# Patient Record
Sex: MALE | Race: BLACK OR AFRICAN AMERICAN | NOT HISPANIC OR LATINO | Employment: FULL TIME | URBAN - METROPOLITAN AREA
[De-identification: names, ages, dates, MRNs, and addresses within clinical notes are randomized per-mention and may not be internally consistent; named-entity substitution may affect disease eponyms.]

---

## 2018-07-13 ENCOUNTER — CLINICAL SUPPORT (OUTPATIENT)
Dept: OCCUPATIONAL MEDICINE | Facility: CLINIC | Age: 33
End: 2018-07-13

## 2018-07-13 DIAGNOSIS — Z00.00 ENCOUNTER FOR PHYSICAL EXAMINATION: ICD-10-CM

## 2018-07-13 PROCEDURE — 89240 UNLISTED MISC PATH TEST: CPT | Mod: S$GLB,,, | Performed by: PHYSICIAN ASSISTANT

## 2018-07-13 PROCEDURE — 94010 BREATHING CAPACITY TEST: CPT | Mod: S$GLB,,, | Performed by: PHYSICIAN ASSISTANT

## 2018-07-13 PROCEDURE — 99499 UNLISTED E&M SERVICE: CPT | Mod: S$GLB,,, | Performed by: PHYSICIAN ASSISTANT

## 2018-07-13 PROCEDURE — 95832 PR HAND MUSCLE TEST,MANUAL: CPT | Mod: S$GLB,,, | Performed by: PREVENTIVE MEDICINE

## 2018-07-13 PROCEDURE — 99002 DEVICE HANDLING PHYS/QHP: CPT | Mod: S$GLB,,, | Performed by: PHYSICIAN ASSISTANT

## 2018-07-13 PROCEDURE — 97750 PHYSICAL PERFORMANCE TEST: CPT | Mod: S$GLB,,, | Performed by: PHYSICIAN ASSISTANT

## 2018-07-13 PROCEDURE — 99080 SPECIAL REPORTS OR FORMS: CPT | Mod: S$GLB,,, | Performed by: PHYSICIAN ASSISTANT

## 2022-09-14 ENCOUNTER — HOSPITAL ENCOUNTER (EMERGENCY)
Facility: HOSPITAL | Age: 37
Discharge: HOME | End: 2022-09-14
Attending: EMERGENCY MEDICINE
Payer: COMMERCIAL

## 2022-09-14 VITALS
WEIGHT: 300 LBS | HEART RATE: 77 BPM | HEIGHT: 74 IN | OXYGEN SATURATION: 96 % | DIASTOLIC BLOOD PRESSURE: 83 MMHG | TEMPERATURE: 99 F | SYSTOLIC BLOOD PRESSURE: 140 MMHG | BODY MASS INDEX: 38.5 KG/M2 | RESPIRATION RATE: 18 BRPM

## 2022-09-14 DIAGNOSIS — L03.90 CELLULITIS, UNSPECIFIED CELLULITIS SITE: Primary | ICD-10-CM

## 2022-09-14 DIAGNOSIS — B99.9 INFECTION: ICD-10-CM

## 2022-09-14 DIAGNOSIS — R52 PAIN: ICD-10-CM

## 2022-09-14 LAB
ALBUMIN SERPL BCP-MCNC: 4.1 G/DL (ref 3.5–5.2)
ALP SERPL-CCNC: 75 U/L (ref 55–135)
ALT SERPL W/O P-5'-P-CCNC: 53 U/L (ref 10–44)
ANION GAP SERPL CALC-SCNC: 10 MMOL/L (ref 8–16)
AST SERPL-CCNC: 34 U/L (ref 10–40)
BASOPHILS # BLD AUTO: 0.04 K/UL (ref 0–0.2)
BASOPHILS NFR BLD: 0.5 % (ref 0–1.9)
BILIRUB SERPL-MCNC: 1 MG/DL (ref 0.1–1)
BUN SERPL-MCNC: 13 MG/DL (ref 6–20)
CALCIUM SERPL-MCNC: 9.2 MG/DL (ref 8.7–10.5)
CHLORIDE SERPL-SCNC: 106 MMOL/L (ref 95–110)
CO2 SERPL-SCNC: 23 MMOL/L (ref 23–29)
CREAT SERPL-MCNC: 1 MG/DL (ref 0.5–1.4)
DIFFERENTIAL METHOD: ABNORMAL
EOSINOPHIL # BLD AUTO: 0.2 K/UL (ref 0–0.5)
EOSINOPHIL NFR BLD: 2.2 % (ref 0–8)
ERYTHROCYTE [DISTWIDTH] IN BLOOD BY AUTOMATED COUNT: 12.5 % (ref 11.5–14.5)
EST. GFR  (NO RACE VARIABLE): >60 ML/MIN/1.73 M^2
GLUCOSE SERPL-MCNC: 89 MG/DL (ref 70–110)
HCT VFR BLD AUTO: 40.4 % (ref 40–54)
HGB BLD-MCNC: 13.8 G/DL (ref 14–18)
IMM GRANULOCYTES # BLD AUTO: 0.04 K/UL (ref 0–0.04)
IMM GRANULOCYTES NFR BLD AUTO: 0.5 % (ref 0–0.5)
LYMPHOCYTES # BLD AUTO: 3.1 K/UL (ref 1–4.8)
LYMPHOCYTES NFR BLD: 40.8 % (ref 18–48)
MCH RBC QN AUTO: 29.1 PG (ref 27–31)
MCHC RBC AUTO-ENTMCNC: 34.2 G/DL (ref 32–36)
MCV RBC AUTO: 85 FL (ref 82–98)
MONOCYTES # BLD AUTO: 0.9 K/UL (ref 0.3–1)
MONOCYTES NFR BLD: 11.4 % (ref 4–15)
NEUTROPHILS # BLD AUTO: 3.4 K/UL (ref 1.8–7.7)
NEUTROPHILS NFR BLD: 44.6 % (ref 38–73)
NRBC BLD-RTO: 0 /100 WBC
PLATELET # BLD AUTO: 290 K/UL (ref 150–450)
PMV BLD AUTO: 9.5 FL (ref 9.2–12.9)
POTASSIUM SERPL-SCNC: 3.9 MMOL/L (ref 3.5–5.1)
PROT SERPL-MCNC: 7.4 G/DL (ref 6–8.4)
RBC # BLD AUTO: 4.74 M/UL (ref 4.6–6.2)
SODIUM SERPL-SCNC: 139 MMOL/L (ref 136–145)
WBC # BLD AUTO: 7.57 K/UL (ref 3.9–12.7)

## 2022-09-14 PROCEDURE — 99285 EMERGENCY DEPT VISIT HI MDM: CPT | Mod: 25

## 2022-09-14 PROCEDURE — 85025 COMPLETE CBC W/AUTO DIFF WBC: CPT | Performed by: NURSE PRACTITIONER

## 2022-09-14 PROCEDURE — 80053 COMPREHEN METABOLIC PANEL: CPT | Performed by: NURSE PRACTITIONER

## 2022-09-14 PROCEDURE — 36415 COLL VENOUS BLD VENIPUNCTURE: CPT | Performed by: NURSE PRACTITIONER

## 2022-09-14 PROCEDURE — 96374 THER/PROPH/DIAG INJ IV PUSH: CPT

## 2022-09-14 PROCEDURE — 63600175 PHARM REV CODE 636 W HCPCS: Performed by: NURSE PRACTITIONER

## 2022-09-14 RX ORDER — CLINDAMYCIN HYDROCHLORIDE 150 MG/1
300 CAPSULE ORAL 4 TIMES DAILY
Qty: 56 CAPSULE | Refills: 0 | Status: SHIPPED | OUTPATIENT
Start: 2022-09-14 | End: 2022-09-21

## 2022-09-14 RX ORDER — KETOROLAC TROMETHAMINE 30 MG/ML
10 INJECTION, SOLUTION INTRAMUSCULAR; INTRAVENOUS
Status: COMPLETED | OUTPATIENT
Start: 2022-09-14 | End: 2022-09-14

## 2022-09-14 RX ADMIN — KETOROLAC TROMETHAMINE 10 MG: 30 INJECTION, SOLUTION INTRAMUSCULAR; INTRAVENOUS at 08:09

## 2022-09-14 NOTE — ED PROVIDER NOTES
Date: 9/14/2022    SCRIBE #1 NOTE: IAmber, am scribing for, and in the presence of,  YOSELIN Zapata.     History     Chief Complaint   Patient presents with    Leg Swelling     Reports swelling to lower left leg -- worsening over the past month. Was on abx but notes no improvement.      Time seen by provider: 6:40 PM on 09/14/2022    Roney Nixon is a 36 y.o. male with an unknown PMHx who presents to the ED for evaluation of LLE swelling with overlying anterior wound ( ~2 cm) and associated pain that onset 1 week ago.  Patient reports no acute injuries to the area.  He notes a frequent Hx of LLE edema secondary to PSHx at age 18, but specifies it has never been like this.  Patient does not take daily medications and has a negative Hx of DM.  He is c/o surrounding redness, wheezing, serous discharge, and gait abnormalities secondary to pain.  Patient walks about 12 hours a day and expresses concern that it may have exacerbated Sx.  The patient denies a fever, CP, SOB or any other symptoms at this time.  He admits chronic difficulty with dorsiflexion due to previous injury and surgery ~18 years ago.  No anticoagulant therapy currently.  Current smoker of 1 pack per week.  PSHx includes L ORIF tibia fracture and L ankle fracture surgery.      The history is provided by the patient.   Review of patient's allergies indicates:   Allergen Reactions    Honey Hives and Swelling     History reviewed. No pertinent past medical history.  Past Surgical History:   Procedure Laterality Date    ANKLE FRACTURE SURGERY Left     ORIF TIBIA FRACTURE Left      Family History   Problem Relation Age of Onset    Hypertension Mother     Hypertension Father     Diabetes Mellitus Maternal Grandmother      Social History     Tobacco Use    Smoking status: Some Days     Years: 3.00     Types: Cigarettes     Review of Systems   Constitutional:  Negative for fever and unexpected weight change.   HENT:  Negative for sore throat.     Respiratory:  Negative for shortness of breath and wheezing.    Cardiovascular:  Positive for leg swelling (L). Negative for chest pain.   Gastrointestinal:  Negative for nausea.   Genitourinary:  Negative for dysuria.   Musculoskeletal:  Positive for gait problem and myalgias. Negative for back pain.   Skin:  Positive for color change (redness) and wound (with serous drainage). Negative for rash.   Neurological:  Negative for weakness.   Hematological:  Does not bruise/bleed easily.     Physical Exam     Initial Vitals [09/14/22 1637]   BP Pulse Resp Temp SpO2   (!) 152/88 90 18 98.5 °F (36.9 °C) 99 %      MAP       --         Physical Exam    Nursing note and vitals reviewed.  Constitutional: Vital signs are normal. He appears well-developed and well-nourished.   HENT:   Head: Normocephalic and atraumatic.   Eyes: Pupils are equal, round, and reactive to light.   Neck: Neck supple.   Cardiovascular:  Normal rate, regular rhythm, normal heart sounds and intact distal pulses.     Exam reveals no gallop and no friction rub.       No murmur heard.  Pulses:       Dorsalis pedis pulses are 2+ on the left side.        Posterior tibial pulses are 2+ on the left side.   Pulmonary/Chest: Breath sounds normal. He has no wheezes. He has no rhonchi. He has no rales.   Abdominal: Abdomen is soft. Bowel sounds are normal. There is no abdominal tenderness.   Musculoskeletal:      Cervical back: Neck supple.      Left lower leg: Swelling and tenderness present.     Neurological: He is alert and oriented to person, place, and time. He has normal strength.   Skin: Skin is warm, dry and intact.   Superficial wound to the L anterior tibia that is ~ 2 cm x 1 cm without pustule drainage with erythematous base.     Psychiatric: He has a normal mood and affect. His speech is normal and behavior is normal.       ED Course   Procedures  Labs Reviewed   CBC W/ AUTO DIFFERENTIAL - Abnormal; Notable for the following components:        Result Value    Hemoglobin 13.8 (*)     All other components within normal limits    Narrative:     Collection has been rescheduled by [email or web address] at 09/14/2022 18:56 Reason:   Patient unavailable   COMPREHENSIVE METABOLIC PANEL - Abnormal; Notable for the following components:    ALT 53 (*)     All other components within normal limits    Narrative:     Collection has been rescheduled by MRQ1 at 09/14/2022 18:56 Reason:   Patient unavailable          Imaging Results              US Lower Extremity Veins Left (Final result)  Result time 09/14/22 19:54:56      Final result by Kathy Hartman MD (09/14/22 19:54:56)                   Impression:      No evidence of deep venous thrombosis in the left lower extremity.      Electronically signed by: Kathy Hartman  Date:    09/14/2022  Time:    19:54               Narrative:    EXAMINATION:  US LOWER EXTREMITY VEINS LEFT    CLINICAL HISTORY:  Unspecified infectious disease    TECHNIQUE:  Duplex and color flow Doppler evaluation and graded compression of the left lower extremity veins was performed.    COMPARISON:  None    FINDINGS:  Left thigh veins: The common femoral, femoral, popliteal, upper greater saphenous, and deep femoral veins are patent and free of thrombus. The veins are normally compressible and have normal phasic flow and augmentation response.    Left calf veins: The visualized calf veins are patent.    Contralateral CFV: The contralateral (right) common femoral vein is patent and free of thrombus.    Miscellaneous: None                                       X-Ray Tibia Fibula 2 View Left (Final result)  Result time 09/14/22 19:50:44      Final result by Kathy Hartman MD (09/14/22 19:50:44)                   Impression:      Soft tissue swelling over the proximal leg.    Prior tibial and fibular fracture with fixation hardware.      Electronically signed by: Kathy Hartman  Date:    09/14/2022  Time:    19:50               Narrative:     EXAMINATION:  XR TIBIA FIBULA 2 VIEW LEFT    CLINICAL HISTORY:  Pain, unspecified    TECHNIQUE:  AP and lateral views of the left tibia and fibula were performed.    COMPARISON:  None.    FINDINGS:  Old fracture deformity with lanie along the tibial diaphysis noted.  Periostitis is seen and appears chronic.  Soft tissue swelling overlies the proximal calf of uncertain etiology.                                       X-Ray Ankle Complete Left (Final result)  Result time 09/14/22 19:49:34      Final result by Kathy Hartman MD (09/14/22 19:49:34)                   Impression:      No acute abnormality.  Degenerative changes.      Electronically signed by: Kathy Hartman  Date:    09/14/2022  Time:    19:49               Narrative:    EXAMINATION:  XR ANKLE COMPLETE 3 VIEW LEFT    CLINICAL HISTORY:  Pain, unspecified    TECHNIQUE:  AP, lateral and oblique views of the left ankle were performed.    COMPARISON:  None    FINDINGS:  Soft tissue swelling over the left ankle.  Periostitis along the distal tibia and fibula.  There are degenerative changes at the ankle joint.  Calcaneal plantar and dorsal enthesophytes noted.  Degenerative changes along the intertarsal joints.                                       Medications   ketorolac injection 9.999 mg (9.999 mg Intravenous Given 9/14/22 2015)     Medical Decision Making:   History:   Old Medical Records: I decided to obtain old medical records.  Differential Diagnosis:   Cellulitis  DVT  fracture  Clinical Tests:   Lab Tests: Ordered and Reviewed  Radiological Study: Ordered and Reviewed     APC / Resident Notes:   Patient is a 36 y.o. male who presents to the ED 09/16/2022 who underwent emergent evaluation for LLE swelling which is chronic due to old injury however recently became worse have developed open wound. There is no abscess. No pain out of proportion. Pt has no systemic signs of illness. No sign of distal neurovascular compromise or compartment syndrome.   Without evidence of DVT.  I do not think DVT.  Labs unremarkable.  No leukocytosis.  The extremity has mild erythema.  This is concerning for cellulitis.  Patient has no recent injury trauma.  X-rays are unremarkable.  I do not think septic joint.  I do not think admission for IV antibiotics is indicated at this time.  Patient is given p.o. antibiotics. Based on my clinical evaluation, I do not appreciate any immediate, emergent, or life threatening condition or etiology that warrants additional workup today and feel that the patient can be discharged with close follow up care.  Follow up and return precautions discussed; patient verbalized understanding and is agreeable to plan of care. Patient discharged home in stable condition.              Scribe Attestation:   Scribe #1: I performed the above scribed service and the documentation accurately describes the services I performed. I attest to the accuracy of the note.    Attending Attestation:           Physician Attestation for Scribe:  Physician Attestation Statement for Scribe #1: I, Negar Guallpa, reviewed documentation, as scribed by in my presence, and it is both accurate and complete.     Comments: I, Negar Guallpa NP-C, personally performed the services described in this documentation. All medical record entries made by the scribe were at my direction and in my presence.  I have reviewed the chart and agree that the record reflects my personal performance and is accurate and complete. YOSELIN Zapata.  8:47 AM 09/16/2022                       Clinical Impression:   Final diagnoses:  [B99.9] Infection  [R52] Pain  [L03.90] Cellulitis, unspecified cellulitis site (Primary)      ED Disposition Condition    Discharge Stable          ED Prescriptions       Medication Sig Dispense Start Date End Date Auth. Provider    clindamycin (CLEOCIN) 150 MG capsule Take 2 capsules (300 mg total) by mouth 4 (four) times daily. for 7 days 56 capsule 9/14/2022 9/21/2022  Negar Guallpa NP          Follow-up Information       Follow up With Specialties Details Why Contact Info    Delicia Glass MD Family Medicine In 2 days  [address] [facility] [address]  [phone]      [facility] Emergency Dept Emergency Medicine  As needed, If symptoms worsen [address] [place] [id number] [phone]             Negar Guallpa NP  09/16/22 0833

## 2022-09-14 NOTE — Clinical Note
"Roney CHONG" Hussain was seen and treated in our emergency department on 9/14/2022.  He may return to work on 09/21/2022.       If you have any questions or concerns, please don't hesitate to call.      Negar Guallpa NP"

## 2022-09-14 NOTE — FIRST PROVIDER EVALUATION
Emergency Department TeleTriage Encounter Note      CHIEF COMPLAINT    Chief Complaint   Patient presents with    Leg Swelling     Reports swelling to lower left leg -- worsening over the past month. Was on abx but notes no improvement.        VITAL SIGNS   Initial Vitals [09/14/22 1637]   BP Pulse Resp Temp SpO2   (!) 152/88 90 18 98.5 °F (36.9 °C) 99 %      MAP       --            ALLERGIES    Review of patient's allergies indicates:   Allergen Reactions    Honey Hives and Swelling       PROVIDER TRIAGE NOTE  Patient presents with complaint of left leg swelling.  He reports symptoms have been present since the end of July.  At that time he was seen at outside facility (I can view the records) which shows an x-ray and an ultrasound to rule out DVT.  He reports that he previous injury to that leg.  He has intermittent swelling.  He states he completed a round of antibiotics with no improvement.  He denies fever.  I am unable to visualize the leg in triage.  He is not febrile.  He is not tachycardic.      Phy:  HEENT: Atraumatic. Normal phonation.   Resp: No increased work of breathing. No audible wheezing  Neuro: Alert and answering questions.       Initial orders will be placed and care will be transferred to an alternate provider when patient is roomed for a full evaluation. Any additional orders and the final disposition will be determined by that provider.        ORDERS  Labs Reviewed - No data to display    ED Orders (720h ago, onward)      None              Virtual Visit Note: The provider triage portion of this emergency department evaluation and documentation was performed via Basic[employer], a HIPAA-compliant telemedicine application, in concert with a tele-presenter in the room. A face to face patient evaluation with one of my colleagues will occur once the patient is placed in an emergency department room.      DISCLAIMER: This note was prepared with L2 voice recognition transcription software.  Garbled syntax, mangled pronouns, and other bizarre constructions may be attributed to that software system.

## 2022-09-15 NOTE — ED NOTES
L leg wound cleaned with wound cleanser. Telfa gauze placed over wound, then a mepilex. Wound bed with fatty tissue-white and pink. No bleeding or pus noted. Leg is elevated on pillow. Instructed pt on wound care and need for elevation. Leg is swollen chronically, and more so now.Pt verb understanding.

## 2024-06-17 ENCOUNTER — HOSPITAL ENCOUNTER (EMERGENCY)
Facility: HOSPITAL | Age: 39
Discharge: HOME | End: 2024-06-17
Attending: EMERGENCY MEDICINE

## 2024-06-17 VITALS
RESPIRATION RATE: 18 BRPM | OXYGEN SATURATION: 98 % | WEIGHT: 315 LBS | TEMPERATURE: 98 F | HEART RATE: 78 BPM | BODY MASS INDEX: 40.43 KG/M2 | DIASTOLIC BLOOD PRESSURE: 82 MMHG | HEIGHT: 74 IN | SYSTOLIC BLOOD PRESSURE: 140 MMHG

## 2024-06-17 DIAGNOSIS — H72.91 PERFORATED RIGHT TYMPANIC MEMBRANE ON EXAMINATION: ICD-10-CM

## 2024-06-17 DIAGNOSIS — H66.93 BILATERAL OTITIS MEDIA, UNSPECIFIED OTITIS MEDIA TYPE: Primary | ICD-10-CM

## 2024-06-17 PROCEDURE — 99284 EMERGENCY DEPT VISIT MOD MDM: CPT

## 2024-06-17 PROCEDURE — 25000003 PHARM REV CODE 250: Performed by: PHYSICIAN ASSISTANT

## 2024-06-17 RX ORDER — ACETAMINOPHEN 325 MG/1
650 TABLET ORAL
Status: COMPLETED | OUTPATIENT
Start: 2024-06-17 | End: 2024-06-17

## 2024-06-17 RX ORDER — AMOXICILLIN 875 MG/1
875 TABLET, FILM COATED ORAL 2 TIMES DAILY
Qty: 20 TABLET | Refills: 0 | Status: SHIPPED | OUTPATIENT
Start: 2024-06-17 | End: 2024-06-17

## 2024-06-17 RX ORDER — AMOXICILLIN 875 MG/1
875 TABLET, FILM COATED ORAL 2 TIMES DAILY
Qty: 20 TABLET | Refills: 0 | Status: SHIPPED | OUTPATIENT
Start: 2024-06-17 | End: 2024-06-27

## 2024-06-17 RX ADMIN — ACETAMINOPHEN 650 MG: 325 TABLET ORAL at 08:06

## 2024-06-17 NOTE — Clinical Note
"Roney CHONG" Hussain was seen and treated in our emergency department on 6/17/2024.  He may return to work on 06/21/2024.       If you have any questions or concerns, please don't hesitate to call.      Alona Brown NP"

## 2024-06-18 NOTE — ED PROVIDER NOTES
Encounter Date: 6/17/2024       History     Chief Complaint   Patient presents with    Otalgia     Bilateral ear pain that started 3 days ago. Pt states he can't hear out of R ear.     Patient is a 38 y.o. male with no significant past medical history who presents to ED via self for concern for bilateral ear pain which began 4 day(s) ago.  Patient reports for the last 4 days he was had pain in both ears.  Patient reports worsening pain on the right side and now today the pain has been worse in the left side.  Patient denies any drainage from the ears.  Patient denies putting anything in his ears.  Patient reports he took Excedrin about 5 hours ago and it did decrease his pain until wore off. Patient reports he would hot flashes yesterday but denies checking a temperature.  Patient reports he was had nasal congestion for the last 4 days.  Patient denies sore throat or difficulty swallowing.  Patient reports an occasional cough.  Patient denies chest pain or shortness of breath.  Patient reports he was had headaches on and off for the last 2 days.  Patient reports he was feels like headaches he was had in the past.  Patient denies headaches being the worst at onset.  Patient reports he felt some lightheaded dizziness earlier today but denies any currently.  Patient denies syncope or head injury.  Patient  Patient denies neck stiffness.  Patient denies drooling or inability to swallow.  Patient sleeping and easily arousable on physical exam.  Patient in no acute distress.      Review of patient's allergies indicates:   Allergen Reactions    Honey Hives and Swelling     History reviewed. No pertinent past medical history.  Past Surgical History:   Procedure Laterality Date    ANKLE FRACTURE SURGERY Left     ORIF TIBIA FRACTURE Left      Family History   Problem Relation Name Age of Onset    Hypertension Mother      Hypertension Father      Diabetes Mellitus Maternal Grandmother       Social History     Tobacco Use     Smoking status: Some Days     Types: Cigarettes     Review of Systems   Constitutional:  Positive for fever.   HENT:  Positive for congestion and ear pain. Negative for dental problem, drooling, ear discharge, facial swelling, mouth sores, postnasal drip, sinus pressure, sinus pain, sore throat, trouble swallowing and voice change.    Eyes: Negative.    Respiratory:  Positive for cough. Negative for apnea, choking, chest tightness, shortness of breath, wheezing and stridor.    Cardiovascular: Negative.  Negative for chest pain.   Gastrointestinal: Negative.  Negative for abdominal pain, nausea and vomiting.   Genitourinary: Negative.    Musculoskeletal:  Positive for neck pain. Negative for back pain and neck stiffness.   Skin:  Negative for color change, pallor and rash.   Neurological:  Positive for dizziness, light-headedness and headaches. Negative for tremors, seizures, syncope, facial asymmetry, speech difficulty, weakness and numbness.   Hematological:  Does not bruise/bleed easily.   Psychiatric/Behavioral: Negative.         Physical Exam     Initial Vitals [06/17/24 1950]   BP Pulse Resp Temp SpO2   (!) 201/107 99 20 98.5 °F (36.9 °C) 96 %      MAP       --         Physical Exam    Nursing note and vitals reviewed.  Constitutional: He appears well-developed and well-nourished. He is not diaphoretic. No distress.   HENT:   Head: Normocephalic and atraumatic. Head is without raccoon's eyes, without Crisostomo's sign, without abrasion and without contusion.   Right Ear: External ear normal. No drainage, swelling or tenderness. No foreign bodies. No mastoid tenderness. Tympanic membrane is perforated, erythematous and bulging. No hemotympanum.   Left Ear: External ear normal. There is tenderness. No drainage or swelling. No foreign bodies. No mastoid tenderness. Tympanic membrane is erythematous and bulging. Tympanic membrane is not perforated. No hemotympanum.   Ears:    Nose: Mucosal edema present. No sinus  tenderness, nasal deformity or nasal septal hematoma. No epistaxis. Right sinus exhibits no maxillary sinus tenderness and no frontal sinus tenderness. Left sinus exhibits no maxillary sinus tenderness and no frontal sinus tenderness.   Mouth/Throat: Uvula is midline, oropharynx is clear and moist and mucous membranes are normal. No dental abscesses or uvula swelling. No oropharyngeal exudate, posterior oropharyngeal edema, posterior oropharyngeal erythema or tonsillar abscesses.   Eyes: Conjunctivae and EOM are normal. Right eye exhibits no discharge. Left eye exhibits no discharge.   Neck: Neck supple.   Normal range of motion.  Cardiovascular:  Normal rate, regular rhythm, normal heart sounds and intact distal pulses.     Exam reveals no gallop and no friction rub.       No murmur heard.  Pulmonary/Chest: Breath sounds normal. No respiratory distress. He has no wheezes. He has no rhonchi. He has no rales. He exhibits no tenderness.   Musculoskeletal:         General: Normal range of motion.      Cervical back: Normal range of motion and neck supple. No edema, erythema or rigidity. Spinous process tenderness present. No muscular tenderness. Normal range of motion.     Neurological: He is alert and oriented to person, place, and time. He has normal strength. He is not disoriented. Coordination normal. GCS score is 15. GCS eye subscore is 4. GCS verbal subscore is 5. GCS motor subscore is 6.   Normal finger-to-nose   Skin: Skin is warm and dry. Capillary refill takes less than 2 seconds.   Psychiatric: He has a normal mood and affect. His behavior is normal. Judgment and thought content normal.         ED Course   Procedures  Labs Reviewed - No data to display       Imaging Results    None          Medications   acetaminophen tablet 650 mg (650 mg Oral Given 6/17/24 2004)     Medical Decision Making  MDM    Patient presents for emergent evaluation of acute ear pain that poses a possible threat to life and/or bodily  function.    Differential diagnosis included but not limited to otitis media, otitis externa, sinusitis, perforated TM, upper viral respiratory illness, migraines, meningitis.  In the ED patient found to have acute clear lung sounds bilaterally increased work of breathing.  Patient was normal range of motion of neck without stiffness.  Patient has mild tenderness to palpation of the cervical spine with mild pain the looking up.  Patient has a no lymphadenopathy on palpation.  Patient was awake and alert and oriented x3.  Patient has normal finger-to-nose.  Patient he was neurovascularly intact.  Patient was PERRLA bilaterally.  Patient was nontoxic appearing.  Patient was in the ED.  Patient was normal posterior pharynx.  Patient noted to have erythematous bulging TMs bilaterally with a TM perforation on the right with dried blood noted.  No active drainage from bilateral ears.  Patient resting comfortably and easily aroused on initial exam.  Low suspicion for intracranial abnormality or meningitis at this time due to patient having limited pain on exam, no neck stiffness, no fever, and no worsening or changing in his normal headaches.      Discharge MDM  I discussed the patient presentation, findings with attending Dr. Angulo.    Patient was managed in the ED with oral Tylenol.    The response to treatment was resolution of pain.  Discussed with the patient was legs who not allow anything with in his ear including water due to perforated TM.  Patient prescribed oral antibiotics.  Patient was discharged in stable condition with close follow up with the ENT.  Detailed return precautions discussed to return to the ED for any new or worsening symptoms.  Patient verbalized understanding.    NP uses Epic and voice recognition software prone to occasional and minor errors that may persist in the medical record.     Risk  OTC drugs.  Prescription drug management.                                  Attending Note:  I  discussed the patient's care with Advanced Practice Clinician.  I reviewed their note and agree with the history, physical, assessment, diagnosis, treatment, all procedures performed, xray and EKG interpretations and discharge plan provided by the Advanced Practice Clinician. My overall impression is bilateral otitis media perforated right TM  The patient has been instructed to follow up with their physician or the one provided as well as specific return precautions.   An Angulo M.D. 6/17/2024 11:45 PM      Clinical Impression:  Final diagnoses:  [H66.93] Bilateral otitis media, unspecified otitis media type (Primary)  [H72.91] Perforated right tympanic membrane on examination          ED Disposition Condition    Discharge Stable          ED Prescriptions       Medication Sig Dispense Start Date End Date Auth. Provider    amoxicillin (AMOXIL) 875 MG tablet  (Status: Discontinued) Take 1 tablet (875 mg total) by mouth 2 (two) times daily. for 10 days 20 tablet 6/17/2024 6/17/2024 Alona Brown NP    amoxicillin (AMOXIL) 875 MG tablet Take 1 tablet (875 mg total) by mouth 2 (two) times daily. for 10 days 20 tablet 6/17/2024 6/27/2024 Alona Brown NP          Follow-up Information       Follow up With Specialties Details Why Contact Info Additional Information    [facility]  Schedule an appointment as soon as possible for a visit  For primary care [address] [facility] [address]  [phone]       Owen Daly MD Otolaryngology Schedule an appointment as soon as possible for a visit  For recheck/continuing care [address] [facility] [address]  [phone]       [facility] - Emergency Dept Emergency Medicine  If symptoms worsen [address] [facility] [id number] [phone] 1st floor             Alona Brown NP  06/17/24 6986       An Angulo MD  06/17/24 0133

## 2024-06-18 NOTE — DISCHARGE INSTRUCTIONS
Please use Flonase 2 sprays up each nostril twice a day for 14 days to help decrease your nasal congestion and pain.  You can alternate Tylenol ibuprofen as needed for pain.  You can take a daily allergy medication such as Zyrtec or Allegra to help decrease your congestion sinus symptoms.  Please take antibiotics as prescribed until gone.  Please follow up with the ENT as soon as possible further evaluation and recheck.    Please return to the ED for worsening ear pain, redness swelling and warmth of the ear, ear drainage, neck pain or stiffness, fever not responding to medication, worsening headaches, persistent changes in vision, passing out, or any new or worsening concerns.

## 2024-06-18 NOTE — FIRST PROVIDER EVALUATION
Emergency Department TeleTriage Encounter Note      CHIEF COMPLAINT    Chief Complaint   Patient presents with    Otalgia     Bilateral ear pain that started 3 days ago. Pt states he can't hear out of R ear.       VITAL SIGNS   Initial Vitals [06/17/24 1950]   BP Pulse Resp Temp SpO2   (!) 201/107 99 20 98.5 °F (36.9 °C) 96 %      MAP       --            ALLERGIES    Review of patient's allergies indicates:   Allergen Reactions    Honey Hives and Swelling       PROVIDER TRIAGE NOTE  Patient presents bilateral ear pain and rigth ear decreased hearing. Reports some recent congestion.       ORDERS  Labs Reviewed - No data to display    ED Orders (720h ago, onward)      None              Virtual Visit Note: The provider triage portion of this emergency department evaluation and documentation was performed via [employer], a HIPAA-compliant telemedicine application, in concert with a tele-presenter in the room. A face to face patient evaluation with one of my colleagues will occur once the patient is placed in an emergency department room.      DISCLAIMER: This note was prepared with [employer] voice recognition transcription software. Garbled syntax, mangled pronouns, and other bizarre constructions may be attributed to that software system.

## 2024-12-20 ENCOUNTER — HOSPITAL ENCOUNTER (EMERGENCY)
Facility: HOSPITAL | Age: 39
Discharge: HOME | End: 2024-12-20
Attending: EMERGENCY MEDICINE

## 2024-12-20 VITALS
HEIGHT: 74 IN | OXYGEN SATURATION: 99 % | HEART RATE: 84 BPM | TEMPERATURE: 98 F | BODY MASS INDEX: 40.43 KG/M2 | RESPIRATION RATE: 16 BRPM | DIASTOLIC BLOOD PRESSURE: 82 MMHG | WEIGHT: 315 LBS | SYSTOLIC BLOOD PRESSURE: 129 MMHG

## 2024-12-20 DIAGNOSIS — T78.40XA ALLERGIC REACTION, INITIAL ENCOUNTER: ICD-10-CM

## 2024-12-20 DIAGNOSIS — L50.8 ACUTE URTICARIA: ICD-10-CM

## 2024-12-20 DIAGNOSIS — T63.481A LOCAL REACTION TO INSECT STING, ACCIDENTAL OR UNINTENTIONAL, INITIAL ENCOUNTER: Primary | ICD-10-CM

## 2024-12-20 LAB
HCV AB SERPL QL IA: NEGATIVE
HIV 1+2 AB+HIV1 P24 AG SERPL QL IA: NEGATIVE

## 2024-12-20 PROCEDURE — 25000003 PHARM REV CODE 250

## 2024-12-20 PROCEDURE — 87389 HIV-1 AG W/HIV-1&-2 AB AG IA: CPT | Performed by: EMERGENCY MEDICINE

## 2024-12-20 PROCEDURE — 99284 EMERGENCY DEPT VISIT MOD MDM: CPT | Mod: 25

## 2024-12-20 PROCEDURE — 96372 THER/PROPH/DIAG INJ SC/IM: CPT

## 2024-12-20 PROCEDURE — 63600175 PHARM REV CODE 636 W HCPCS

## 2024-12-20 PROCEDURE — 86803 HEPATITIS C AB TEST: CPT | Performed by: EMERGENCY MEDICINE

## 2024-12-20 RX ORDER — FAMOTIDINE 20 MG/1
20 TABLET, FILM COATED ORAL
Status: COMPLETED | OUTPATIENT
Start: 2024-12-20 | End: 2024-12-20

## 2024-12-20 RX ORDER — DIPHENHYDRAMINE HCL 25 MG
25 CAPSULE ORAL
Status: COMPLETED | OUTPATIENT
Start: 2024-12-20 | End: 2024-12-20

## 2024-12-20 RX ORDER — FAMOTIDINE 20 MG/1
20 TABLET, FILM COATED ORAL 2 TIMES DAILY
Qty: 14 TABLET | Refills: 0 | Status: SHIPPED | OUTPATIENT
Start: 2024-12-21 | End: 2024-12-28

## 2024-12-20 RX ORDER — PREDNISONE 20 MG/1
40 TABLET ORAL DAILY
Qty: 6 TABLET | Refills: 0 | Status: SHIPPED | OUTPATIENT
Start: 2024-12-21 | End: 2024-12-24

## 2024-12-20 RX ORDER — CETIRIZINE HYDROCHLORIDE 10 MG/1
10 TABLET ORAL DAILY
Qty: 7 TABLET | Refills: 0 | Status: SHIPPED | OUTPATIENT
Start: 2024-12-20 | End: 2024-12-27

## 2024-12-20 RX ORDER — DEXAMETHASONE SODIUM PHOSPHATE 4 MG/ML
8 INJECTION, SOLUTION INTRA-ARTICULAR; INTRALESIONAL; INTRAMUSCULAR; INTRAVENOUS; SOFT TISSUE
Status: COMPLETED | OUTPATIENT
Start: 2024-12-20 | End: 2024-12-20

## 2024-12-20 RX ADMIN — DEXAMETHASONE SODIUM PHOSPHATE 8 MG: 4 INJECTION, SOLUTION INTRA-ARTICULAR; INTRALESIONAL; INTRAMUSCULAR; INTRAVENOUS; SOFT TISSUE at 09:12

## 2024-12-20 RX ADMIN — FAMOTIDINE 20 MG: 20 TABLET ORAL at 09:12

## 2024-12-20 RX ADMIN — DIPHENHYDRAMINE HYDROCHLORIDE 25 MG: 25 CAPSULE ORAL at 09:12

## 2024-12-20 NOTE — DISCHARGE INSTRUCTIONS
Follow up with [facility] for primary care and further evaluation and rechecked.  Take the Zyrtec once a day help with the antihistamine response.  Take the steroid once a day.  You can take the Pepcid twice a day.  Take Benadryl as needed for itchiness.  You can also do topical over-the-counter 1% hydrated cortisone cream to help decrease itching.  You can try applying ice to the area to help decrease swelling.    Please return to the ED for worsening neck swelling, feel like your throat is closing up, difficulty breathing, drooling, chest pain, shortness breath, difficulty breathing, fever, or any new or worsening concerns.

## 2024-12-20 NOTE — Clinical Note
"Roney CHONG" Hussain was seen and treated in our emergency department on 12/20/2024.  He may return to work on 12/24/2024.       If you have any questions or concerns, please don't hesitate to call.      Stephen Sandoval MD"

## 2024-12-20 NOTE — ED PROVIDER NOTES
Encounter Date: 12/20/2024       History     Chief Complaint   Patient presents with    Insect Bite     Patient was bitten three times on left side of neck yesterday evening - pt took benedryl last night with no relief and when patient woke up, he states swelling worsened - there is visible redness and swelling on left side of neck - patient claims no pain but there is itching     Patient is a 39 y.o. male with no significant past medical history who presents to ED via self for concern for insect bites which began 1 day(s) ago.  Patient reports yesterday around 2:00 p.m. he was doing maintenance at an apartment complex when he is walking outside in the [facility] and felt something bite him or sting him on the back of his neck 3 times.  Patient reports at 1st that has stinging and burning.  Patient reports he had whelps pop up in his neck.  Patient reports he took Benadryl after that.  Patient reports when he woke up this morning the left side of his neck was swollen where the whelps RN decided to come to the hospital.  Patient denies difficulty swallowing, sore throat, or difficulty breathing.  Patient denies shortness of breath or chest pain.  Patient is awake and alert in no acute distress.        Review of patient's allergies indicates:   Allergen Reactions    Honey Hives and Swelling     History reviewed. No pertinent past medical history.  Past Surgical History:   Procedure Laterality Date    ANKLE FRACTURE SURGERY Left     ORIF TIBIA FRACTURE Left      Family History   Problem Relation Name Age of Onset    Hypertension Mother      Hypertension Father      Diabetes Mellitus Maternal Grandmother       Social History     Tobacco Use    Smoking status: Some Days     Types: Cigarettes     Review of Systems   Constitutional: Negative.  Negative for fever.   HENT: Negative.  Negative for drooling, trouble swallowing and voice change.    Respiratory: Negative.  Negative for shortness of breath.    Cardiovascular:  Negative.  Negative for chest pain.   Gastrointestinal: Negative.  Negative for nausea and vomiting.   Musculoskeletal:  Positive for neck pain. Negative for back pain and neck stiffness.   Skin:  Positive for rash. Negative for color change and pallor.   Neurological:  Negative for tremors, seizures, syncope, facial asymmetry, speech difficulty, weakness, light-headedness and numbness.   Hematological:  Does not bruise/bleed easily.   Psychiatric/Behavioral: Negative.         Physical Exam     Initial Vitals [12/20/24 0832]   BP Pulse Resp Temp SpO2   (!) 189/81 92 18 98.4 °F (36.9 °C) 98 %      MAP       --         Physical Exam    Nursing note and vitals reviewed.  Constitutional: He appears well-developed and well-nourished. He is not diaphoretic. No distress.   HENT:   Head: Normocephalic and atraumatic.   Right Ear: External ear normal.   Left Ear: External ear normal.   Eyes: EOM are normal.   Neck: Neck supple. No stridor present.       Normal range of motion.   Full passive range of motion without pain.     Cardiovascular:  Normal rate, regular rhythm, normal heart sounds and intact distal pulses.     Exam reveals no gallop and no friction rub.       No murmur heard.  Pulmonary/Chest: Breath sounds normal. No respiratory distress. He has no wheezes. He has no rhonchi. He has no rales. He exhibits no tenderness.   Musculoskeletal:         General: Normal range of motion.      Cervical back: Full passive range of motion without pain, normal range of motion and neck supple. No edema, erythema or rigidity. No spinous process tenderness. Normal range of motion.     Neurological: He is alert and oriented to person, place, and time. He has normal strength. GCS score is 15. GCS eye subscore is 4. GCS verbal subscore is 5. GCS motor subscore is 6.   Skin: Skin is warm and dry. Capillary refill takes less than 2 seconds. Rash noted. No petechiae noted. Rash is urticarial.   Psychiatric: He has a normal mood and  affect. His behavior is normal. Judgment and thought content normal.         ED Course   Procedures  Labs Reviewed   HEPATITIS C ANTIBODY       Result Value    Hepatitis C Ab Negative      Narrative:     Release to patient->Immediate   HIV 1 / 2 ANTIBODY    HIV 1/2 Ag/Ab Negative      Narrative:     Release to patient->Immediate          Imaging Results    None          Medications   diphenhydrAMINE capsule 25 mg (25 mg Oral Given 12/20/24 0927)   famotidine tablet 20 mg (20 mg Oral Given 12/20/24 0928)   dexAMETHasone injection 8 mg (8 mg Intramuscular Given 12/20/24 0929)     Medical Decision Making  MDM    Patient presents for emergent evaluation of acute insect bite that poses a possible threat to life and/or bodily function.    Differential diagnosis included but not limited to allergic reaction, anaphylaxis, localized reaction to insect bite, urticaria, soft tissue mass.  In the ED patient found to have acute localized whelps from possible insect bites to left side of the neck with localized swelling.  Patient endorses it to be itchy.  There is a firmness felt with palpation.  No other rashes or bites noted to patient's torso.  Patient denies any other spots that are bothering him.  Patient has a normal-appearing posterior pharynx without erythema or swelling.  Patient maintaining secretions normally.  Patient has normal range of motion of neck without stiffness.    Patient appears to be having a localized allergic reaction to insect bites.  I do not appreciate any emergent process on ED exam or workup.      Discharge MDM  I discussed the patient presentation labs, findings with ED attending Dr. Sandoval.    Patient was managed in the ED with oral Benadryl, Pepcid, and IM dexamethasone.    The response to treatment was good.    Patient was discharged in stable condition with close follow up with primary care.  Patient discharged with oral steroids, Pepcid, and Zyrtec. Detailed return precautions discussed to  return to the ED for any new or worsening concerns.  Patient verbalizes understanding.    NP uses Epic and voice recognition software prone to occasional and minor errors that may persist in the medical record.      Risk  OTC drugs.  Prescription drug management.              Attending Attestation:     Physician Attestation Statement for NP/PA:       Other NP/PA Attestation Additions:    History of Present Illness: 39-year-old male presents for insect bites.    Medical Decision Making: Initial differential diagnosis included but not limited to allergic reaction, localized reaction, and cellulitis.  The patient reports he feels better after medications here in the emergency department.  I am in agreement with the nurse practitioner's assessment, treatment, and plan of care.             ED Course as of 12/20/24 1728   Fri Dec 20, 2024   1031 Patient reports he feels [MP]      ED Course User Index  [MP] Alona Bronw NP                           Clinical Impression:  Final diagnoses:  [L50.8] Acute urticaria  [T78.40XA] Allergic reaction, initial encounter  [T63.481A] Local reaction to insect sting, accidental or unintentional, initial encounter (Primary)          ED Disposition Condition    Discharge Stable          ED Prescriptions       Medication Sig Dispense Start Date End Date Auth. Provider    predniSONE (DELTASONE) 20 MG tablet Take 2 tablets (40 mg total) by mouth once daily. for 3 days 6 tablet 12/21/2024 12/24/2024 Alona Brown NP    famotidine (PEPCID) 20 MG tablet Take 1 tablet (20 mg total) by mouth 2 (two) times daily. for 7 days 14 tablet 12/21/2024 12/28/2024 Alona Brown NP    cetirizine (ZYRTEC) 10 MG tablet Take 1 tablet (10 mg total) by mouth once daily. for 7 days 7 tablet 12/20/2024 12/27/2024 Alona Brown NP          Follow-up Information       Follow up With Specialties Details Why Contact Info Additional Information    [facility]   Schedule an appointment as soon as possible for a visit  For primary care, For recheck/continuing care [address] [facility] [address]  [phone]       [facility] - Emergency Dept Emergency Medicine  If symptoms worsen [address]  [facility] [address]  [phone] 1st floor    [facility]    [address] [facility] [address]  [phone]                Alona Brown NP  12/20/24 1707       Stephen Sandoval MD  12/20/24 1720

## 2025-04-24 ENCOUNTER — OCCUPATIONAL HEALTH (OUTPATIENT)
Dept: URGENT CARE | Facility: CLINIC | Age: 40
End: 2025-04-24

## 2025-04-24 DIAGNOSIS — Z00.00 ROUTINE GENERAL MEDICAL EXAMINATION AT A HEALTH CARE FACILITY: Primary | ICD-10-CM

## 2025-04-24 PROCEDURE — 80305 DRUG TEST PRSMV DIR OPT OBS: CPT | Mod: S$GLB,,, | Performed by: NURSE PRACTITIONER

## 2025-04-24 PROCEDURE — 99499 UNLISTED E&M SERVICE: CPT | Mod: S$GLB,,, | Performed by: NURSE PRACTITIONER

## 2025-04-24 NOTE — PROGRESS NOTES
10 panel drug screen and basic physical    
Anticipated Starting Dosage (Optional): 40mg Daily
Initial Triglycerides (Optional): pending
Detail Level: Zone
Patient Reported Weight (Optional - Include Units): 174 lbs

## 2025-05-26 ENCOUNTER — HOSPITAL ENCOUNTER (EMERGENCY)
Facility: HOSPITAL | Age: 40
Discharge: HOME | End: 2025-05-26
Attending: EMERGENCY MEDICINE

## 2025-05-26 VITALS
HEIGHT: 74 IN | RESPIRATION RATE: 18 BRPM | HEART RATE: 83 BPM | OXYGEN SATURATION: 99 % | TEMPERATURE: 98 F | SYSTOLIC BLOOD PRESSURE: 162 MMHG | WEIGHT: 315 LBS | BODY MASS INDEX: 40.43 KG/M2 | DIASTOLIC BLOOD PRESSURE: 85 MMHG

## 2025-05-26 DIAGNOSIS — R73.9 HYPERGLYCEMIA: ICD-10-CM

## 2025-05-26 DIAGNOSIS — E11.65 TYPE 2 DIABETES MELLITUS WITH HYPERGLYCEMIA, WITHOUT LONG-TERM CURRENT USE OF INSULIN: Primary | ICD-10-CM

## 2025-05-26 LAB
ABSOLUTE EOSINOPHIL (SMH): 0.1 K/UL
ABSOLUTE MONOCYTE (SMH): 0.66 K/UL (ref 0.3–1)
ABSOLUTE NEUTROPHIL COUNT (SMH): 3.6 K/UL (ref 1.8–7.7)
ALBUMIN SERPL-MCNC: 4.5 G/DL (ref 3.5–5.2)
ALLENS TEST: ABNORMAL
ALP SERPL-CCNC: 93 UNIT/L (ref 55–135)
ALT SERPL-CCNC: 64 UNIT/L (ref 10–44)
ANION GAP (SMH): 11 MMOL/L (ref 8–16)
AST SERPL-CCNC: 29 UNIT/L (ref 10–40)
B-OH-BUTYR BLD STRIP-SCNC: 0.1 MMOL/L
BASOPHILS # BLD AUTO: 0.04 K/UL
BASOPHILS NFR BLD AUTO: 0.5 %
BILIRUB SERPL-MCNC: 0.7 MG/DL (ref 0.1–1)
BILIRUB UR QL STRIP.AUTO: NEGATIVE
BUN SERPL-MCNC: 15 MG/DL (ref 6–20)
CALCIUM SERPL-MCNC: 9.3 MG/DL (ref 8.7–10.5)
CHLORIDE SERPL-SCNC: 97 MMOL/L (ref 95–110)
CLARITY UR: CLEAR
CO2 SERPL-SCNC: 22 MMOL/L (ref 23–29)
COLOR UR AUTO: COLORLESS
CREAT SERPL-MCNC: 1.1 MG/DL (ref 0.5–1.4)
DELSYS: ABNORMAL
ERYTHROCYTE [DISTWIDTH] IN BLOOD BY AUTOMATED COUNT: 12 % (ref 11.5–14.5)
GFR SERPLBLD CREATININE-BSD FMLA CKD-EPI: >60 ML/MIN/1.73/M2
GLUCOSE SERPL-MCNC: 501 MG/DL (ref 70–110)
GLUCOSE SERPL-MCNC: 517 MG/DL (ref 70–110)
GLUCOSE UR QL STRIP: ABNORMAL
HCO3 UR-SCNC: 25.6 MMOL/L (ref 24–28)
HCT VFR BLD AUTO: 42.1 % (ref 40–54)
HCT VFR BLD CALC: 45 %PCV (ref 36–54)
HGB BLD-MCNC: 14.7 GM/DL (ref 14–18)
HGB UR QL STRIP: NEGATIVE
IMM GRANULOCYTES # BLD AUTO: 0.06 K/UL (ref 0–0.04)
IMM GRANULOCYTES NFR BLD AUTO: 0.7 % (ref 0–0.5)
KETONES UR QL STRIP: NEGATIVE
LEUKOCYTE ESTERASE UR QL STRIP: NEGATIVE
LYMPHOCYTES # BLD AUTO: 3.6 K/UL (ref 1–4.8)
MCH RBC QN AUTO: 29.3 PG (ref 27–31)
MCHC RBC AUTO-ENTMCNC: 34.9 G/DL (ref 32–36)
MCV RBC AUTO: 84 FL (ref 82–98)
MICROSCOPIC COMMENT: NORMAL
NITRITE UR QL STRIP: NEGATIVE
NUCLEATED RBC (/100WBC) (SMH): 0 /100 WBC
PCO2 BLDA: 47.9 MMHG (ref 35–45)
PH SMN: 7.33 [PH] (ref 7.35–7.45)
PH UR STRIP: 6 [PH]
PLATELET # BLD AUTO: 296 K/UL (ref 150–450)
PMV BLD AUTO: 9.4 FL (ref 9.2–12.9)
PO2 BLDA: 33 MMHG (ref 40–60)
POC BE: 0 MMOL/L (ref -2–2)
POC IONIZED CALCIUM: 1.23 MMOL/L (ref 1.06–1.42)
POC SATURATED O2: 59 % (ref 95–100)
POC TCO2: 27 MMOL/L (ref 24–29)
POCT GLUCOSE: 329 MG/DL (ref 70–110)
POCT GLUCOSE: 417 MG/DL (ref 70–110)
POCT GLUCOSE: >500 MG/DL (ref 70–110)
POTASSIUM BLD-SCNC: 4.3 MMOL/L (ref 3.5–5.1)
POTASSIUM SERPL-SCNC: 4.3 MMOL/L (ref 3.5–5.1)
PROT SERPL-MCNC: 8 GM/DL (ref 6–8.4)
PROT UR QL STRIP: NEGATIVE
RBC # BLD AUTO: 5.01 M/UL (ref 4.6–6.2)
RBC #/AREA URNS AUTO: <1 /HPF
RELATIVE EOSINOPHIL (SMH): 1.2 % (ref 0–8)
RELATIVE LYMPHOCYTE (SMH): 44.7 % (ref 18–48)
RELATIVE MONOCYTE (SMH): 8.2 % (ref 4–15)
RELATIVE NEUTROPHIL (SMH): 44.7 % (ref 38–73)
SAMPLE: ABNORMAL
SITE: ABNORMAL
SODIUM BLD-SCNC: 133 MMOL/L (ref 136–145)
SODIUM SERPL-SCNC: 130 MMOL/L (ref 136–145)
SP GR UR STRIP: >=1.03
UROBILINOGEN UR STRIP-ACNC: NEGATIVE EU/DL
WBC # BLD AUTO: 8.05 K/UL (ref 3.9–12.7)
WBC #/AREA URNS AUTO: <1 /HPF

## 2025-05-26 PROCEDURE — 82962 GLUCOSE BLOOD TEST: CPT

## 2025-05-26 PROCEDURE — 84132 ASSAY OF SERUM POTASSIUM: CPT

## 2025-05-26 PROCEDURE — 82330 ASSAY OF CALCIUM: CPT

## 2025-05-26 PROCEDURE — 82010 KETONE BODYS QUAN: CPT | Performed by: EMERGENCY MEDICINE

## 2025-05-26 PROCEDURE — 93005 ELECTROCARDIOGRAM TRACING: CPT | Performed by: GENERAL PRACTICE

## 2025-05-26 PROCEDURE — 96361 HYDRATE IV INFUSION ADD-ON: CPT

## 2025-05-26 PROCEDURE — 96374 THER/PROPH/DIAG INJ IV PUSH: CPT

## 2025-05-26 PROCEDURE — 93010 ELECTROCARDIOGRAM REPORT: CPT | Mod: ,,, | Performed by: GENERAL PRACTICE

## 2025-05-26 PROCEDURE — 85014 HEMATOCRIT: CPT

## 2025-05-26 PROCEDURE — 82040 ASSAY OF SERUM ALBUMIN: CPT | Performed by: EMERGENCY MEDICINE

## 2025-05-26 PROCEDURE — 84295 ASSAY OF SERUM SODIUM: CPT

## 2025-05-26 PROCEDURE — 99285 EMERGENCY DEPT VISIT HI MDM: CPT | Mod: 25

## 2025-05-26 PROCEDURE — 82803 BLOOD GASES ANY COMBINATION: CPT

## 2025-05-26 PROCEDURE — 85025 COMPLETE CBC W/AUTO DIFF WBC: CPT | Performed by: EMERGENCY MEDICINE

## 2025-05-26 PROCEDURE — 99900035 HC TECH TIME PER 15 MIN (STAT)

## 2025-05-26 PROCEDURE — 25000003 PHARM REV CODE 250: Performed by: EMERGENCY MEDICINE

## 2025-05-26 PROCEDURE — 63600175 PHARM REV CODE 636 W HCPCS: Performed by: EMERGENCY MEDICINE

## 2025-05-26 PROCEDURE — 83036 HEMOGLOBIN GLYCOSYLATED A1C: CPT | Performed by: EMERGENCY MEDICINE

## 2025-05-26 PROCEDURE — 99900031 HC PATIENT EDUCATION (STAT)

## 2025-05-26 PROCEDURE — 94761 N-INVAS EAR/PLS OXIMETRY MLT: CPT | Mod: XB

## 2025-05-26 PROCEDURE — 81003 URINALYSIS AUTO W/O SCOPE: CPT | Performed by: EMERGENCY MEDICINE

## 2025-05-26 RX ORDER — METFORMIN HYDROCHLORIDE 1000 MG/1
1000 TABLET ORAL 2 TIMES DAILY
Qty: 60 TABLET | Refills: 2 | Status: SHIPPED | OUTPATIENT
Start: 2025-05-26 | End: 2025-08-24

## 2025-05-26 RX ORDER — SODIUM CHLORIDE 9 MG/ML
1000 INJECTION, SOLUTION INTRAVENOUS
Status: COMPLETED | OUTPATIENT
Start: 2025-05-26 | End: 2025-05-26

## 2025-05-26 RX ORDER — INSULIN PUMP SYRINGE, 3 ML
EACH MISCELLANEOUS
Qty: 1 EACH | Refills: 0 | Status: SHIPPED | OUTPATIENT
Start: 2025-05-26 | End: 2026-05-26

## 2025-05-26 RX ORDER — METFORMIN HYDROCHLORIDE 500 MG/1
1000 TABLET ORAL 2 TIMES DAILY WITH MEALS
Status: DISCONTINUED | OUTPATIENT
Start: 2025-05-27 | End: 2025-05-26

## 2025-05-26 RX ORDER — METFORMIN HYDROCHLORIDE 500 MG/1
1000 TABLET ORAL
Status: COMPLETED | OUTPATIENT
Start: 2025-05-26 | End: 2025-05-26

## 2025-05-26 RX ADMIN — METFORMIN HYDROCHLORIDE 1000 MG: 500 TABLET, FILM COATED ORAL at 09:05

## 2025-05-26 RX ADMIN — SODIUM CHLORIDE 1000 ML: 9 INJECTION, SOLUTION INTRAVENOUS at 07:05

## 2025-05-26 RX ADMIN — SODIUM CHLORIDE 1000 ML: 9 INJECTION, SOLUTION INTRAVENOUS at 08:05

## 2025-05-26 RX ADMIN — INSULIN HUMAN 5 UNITS: 100 INJECTION, SOLUTION PARENTERAL at 10:05

## 2025-05-26 RX ADMIN — SODIUM CHLORIDE 1000 ML: 9 INJECTION, SOLUTION INTRAVENOUS at 10:05

## 2025-05-27 LAB
EAG (SMH): 286 MG/DL (ref 68–131)
HBA1C MFR BLD: 11.6 % (ref 4.5–6.2)
OHS QRS DURATION: 94 MS
OHS QTC CALCULATION: 418 MS

## 2025-05-27 NOTE — ED PROVIDER NOTES
Encounter Date: 5/26/2025       History     Chief Complaint   Patient presents with    Hyperglycemia     Pt not dx with diabetes.  Increased thirst and urination     This is a 39-year-old male with past medical history of obesity, ankle fracture surgery many years ago, who presents emergency department with concern for high blood sugar.  Patient blood sugar in the emergency department greater than 500, immeasurable.  Patient has been having increased thirst, increased urination, times blurry vision, says he has had maybe a 5 lb weight loss recently.  He says that he has not been sick recently.  He does not have any fevers or chills.  He denies any abdominal pain vomiting or any diarrhea.  He has no known history of diabetes.      Review of patient's allergies indicates:   Allergen Reactions    Honey Hives and Swelling     No past medical history on file.  Past Surgical History:   Procedure Laterality Date    ANKLE FRACTURE SURGERY Left     ORIF TIBIA FRACTURE Left      Family History   Problem Relation Name Age of Onset    Hypertension Mother      Hypertension Father      Diabetes Mellitus Maternal Grandmother       Social History[1]  Review of Systems   Constitutional:  Negative for chills and fever.   HENT:  Negative for sore throat.    Respiratory:  Negative for shortness of breath.    Cardiovascular:  Negative for chest pain and palpitations.   Gastrointestinal:  Negative for abdominal pain, diarrhea, nausea and vomiting.   Endocrine: Positive for polyphagia and polyuria.   Genitourinary:  Negative for dysuria and flank pain.   Musculoskeletal:  Negative for back pain.   Skin:  Negative for rash.   Neurological:  Negative for weakness and headaches.   Hematological:  Does not bruise/bleed easily.   All other systems reviewed and are negative.      Physical Exam     Initial Vitals [05/26/25 1927]   BP Pulse Resp Temp SpO2   (!) 138/92 88 18 98.2 °F (36.8 °C) 99 %      MAP       --         Physical Exam    Nursing  note and vitals reviewed.  Constitutional: He appears well-developed and well-nourished. He is not diaphoretic. He is Obese . No distress.   HENT:   Head: Normocephalic and atraumatic. Mouth/Throat: Oropharynx is clear and moist. No oropharyngeal exudate.   Eyes: Conjunctivae and EOM are normal. Pupils are equal, round, and reactive to light.   Neck: Neck supple. No tracheal deviation present.   Cardiovascular:  Normal rate, regular rhythm, normal heart sounds and intact distal pulses.           No murmur heard.  Pulmonary/Chest: Breath sounds normal. No stridor. No respiratory distress. He has no wheezes. He has no rhonchi. He has no rales.   Abdominal: Abdomen is soft. Bowel sounds are normal. He exhibits no distension. There is no abdominal tenderness. There is no rebound and no guarding.   Musculoskeletal:         General: No tenderness or edema. Normal range of motion.      Cervical back: Neck supple.     Neurological: He is alert and oriented to person, place, and time. He has normal strength. No cranial nerve deficit or sensory deficit.   Skin: Skin is warm and dry. Capillary refill takes less than 2 seconds. No rash noted. No erythema. No pallor.   Psychiatric: He has a normal mood and affect. Thought content normal.         ED Course   Procedures  Labs Reviewed   COMPREHENSIVE METABOLIC PANEL - Abnormal       Result Value    Sodium 130 (*)     Potassium 4.3      Chloride 97      CO2 22 (*)     Glucose 517 (*)     BUN 15      Creatinine 1.1      Calcium 9.3      Protein Total 8.0      Albumin 4.5      Bilirubin Total 0.7      ALP 93      AST 29      ALT 64 (*)     Anion Gap 11      eGFR >60     CBC WITH DIFFERENTIAL - Abnormal    WBC 8.05      RBC 5.01      Hgb 14.7      Hct 42.1      MCV 84      MCH 29.3      MCHC 34.9      RDW 12.0      Platelet Count 296      MPV 9.4      Nucleated RBC 0      Neut % 44.7      Lymph % 44.7      Mono % 8.2      Eos % 1.2      Basophil % 0.5      Imm Grans % 0.7 (*)     Neut  # 3.6      Lymph # 3.60      Mono # 0.66      Eos # 0.10      Baso # 0.04      Imm Grans # 0.06 (*)    POCT GLUCOSE - Abnormal    POCT Glucose >500 (*)    ISTAT PROCEDURE - Abnormal    POC PH 7.335 (*)     POC PCO2 47.9 (*)     POC PO2 33 (*)     POC HCO3 25.6      POC BE 0      POC SATURATED O2 59      POC Glucose 501 (*)     POC Sodium 133 (*)     POC Potassium 4.3      POC TCO2 27      POC Ionized Calcium 1.23      POC Hematocrit 45      Sample VENOUS      Site Other      Allens Test N/A      DelSys Room Air     BETA - HYDROXYBUTYRATE, SERUM - Normal    Beta-Hydroxybutyrate 0.1     CBC W/ AUTO DIFFERENTIAL    Narrative:     The following orders were created for panel order CBC auto differential.  Procedure                               Abnormality         Status                     ---------                               -----------         ------                     CBC with Differential[[id number]]       Abnormal            Final result                 Please view results for these tests on the individual orders.   URINALYSIS, REFLEX TO URINE CULTURE   EXTRA TUBES    Narrative:     The following orders were created for panel order EXTRA TUBES.  Procedure                               Abnormality         Status                     ---------                               -----------         ------                     Light Blue Top Hold[[id number]]                             In process                 Lavender Top Hold[[id number]]                               In process                   Please view results for these tests on the individual orders.   LIGHT BLUE TOP HOLD   LAVENDER TOP HOLD   HEMOGLOBIN A1C   POCT GLUCOSE MONITORING CONTINUOUS          Imaging Results              X-Ray Chest AP Portable (In process)                      Medications   sodium chloride 0.9% bolus 1,000 mL 1,000 mL (1,000 mLs Intravenous New Bag 5/26/25 2025)   metFORMIN tablet 1,000 mg (has no administration in time range)    sodium chloride 0.9% bolus 1,000 mL 1,000 mL ( Intravenous Restarted 5/26/25 2047)     Medical Decision Making  Differential includes but not limited to new onset diabetes, DKA, uncontrolled high blood sugar,     Emergent evaluation of a 39-year-old male presents emergency department with high blood sugar and uncontrolled diabetes type symptoms.  Patient has new onset diabetes mellitus with blood sugar 517.  He is not in DKA.  No ketones in the blood.  No anion gap.  He is not acidotic.  He has all his symptoms of uncontrolled diabetes.  I counseled him on this in detail spent a lot of time in the room talking to him about diabetes, diet/exercise/medications, he knows he needs close outpatient follow up.  I have wrote for him to have a blood glucose monitoring and will put him on metformin.  His son is actually diabetic so he does have a way to check his sugar at home until he is able to get a blood glucose monitor.  Blood glucose has improved after fluids and metformin.  He knows he needs close outpatient follow up, diet/exercise/lifestyle modifications and he will follow up on an outpatient basis.  He will return if his symptoms change or worsen.    A dictation software program was used for this note.  Please expect some simple typographical  errors in this note.    I had a detailed discussion with the patient and/or guardian regarding: The historical points, exam findings, and diagnostic results supporting the discharge diagnosis, lab results, pertinent radiology results, and the need for outpatient follow-up, for definitive care with a family practitioner and to return to the emergency department if symptoms worsen or persist or if there are any questions or concerns that arise at home. All questions have been answered in detail. Strict return to Emergency Department precautions have been provided       Amount and/or Complexity of Data Reviewed  External Data Reviewed: labs and notes.  Labs: ordered.  Decision-making details documented in ED Course.  Radiology: ordered and independent interpretation performed. Decision-making details documented in ED Course.  ECG/medicine tests: ordered and independent interpretation performed. Decision-making details documented in ED Course.    Risk  OTC drugs.  Prescription drug management.  Decision regarding hospitalization.               ED Course as of 05/26/25 2049   Mon May 26, 2025   2047 EKG 8:42 p.m. normal sinus rhythm rate of 83.  No ST elevation or depression.  No evidence of ischemia.  Normal intervals.  No STEMI.  EKG interpreted independently by me. [JR]      ED Course User Index  [JR] Fidel Kohler DO                           Clinical Impression:  Final diagnoses:  [R73.9] Hyperglycemia  [E11.65] Type 2 diabetes mellitus with hyperglycemia, without long-term current use of insulin - New onset (Primary)          ED Disposition Condition    Discharge Stable          ED Prescriptions       Medication Sig Dispense Start Date End Date Auth. Provider    blood-glucose meter kit Use as instructed 1 each 5/26/2025 5/26/2026 Fidel Kohler DO    metFORMIN (GLUCOPHAGE) 1000 MG tablet Take 1 tablet (1,000 mg total) by mouth 2 (two) times daily. 60 tablet 5/26/2025 8/24/2025 Fidel Kohler DO          Follow-up Information       Follow up With Specialties Details Why Contact Info Additional Information    [facility] - Emergency Dept Emergency Medicine  If symptoms worsen [address] [facility] [address]  [phone] 1st floor    [facility]  In 2 days 3 [address] [facility] [address]  [phone]                    Fidel Kohler DO  05/26/25 2049         [1]   Social History  Tobacco Use    Smoking status: Some Days     Types: Cigarettes        Fidel Kohler DO  05/26/25 2049

## 2025-05-27 NOTE — DISCHARGE INSTRUCTIONS
RETURN TO EMERGENCY DEPARTMENT WITHOUT FAIL, IF YOUR SYMPTOMS WORSEN, IF YOU GET NEW OR DIFFERENT SYMPTOMS, IF YOU ARE UNABLE TO FOLLOW UP AS DIRECTED, OR IF YOU HAVE ANY CONCERNS OR WORRIES.    Follow up closely with your primary care provider/[facility].  You need a blood glucose monitor.  You need close outpatient follow up.  Take the metformin as directed.  Weight loss/diet and exercise like we talked about.